# Patient Record
Sex: FEMALE | URBAN - METROPOLITAN AREA
[De-identification: names, ages, dates, MRNs, and addresses within clinical notes are randomized per-mention and may not be internally consistent; named-entity substitution may affect disease eponyms.]

---

## 2022-06-13 ENCOUNTER — OFFICE VISIT (OUTPATIENT)
Dept: URBAN - METROPOLITAN AREA CLINIC 94 | Facility: CLINIC | Age: 82
End: 2022-06-13

## 2025-06-12 ENCOUNTER — OFFICE VISIT (OUTPATIENT)
Dept: URBAN - METROPOLITAN AREA CLINIC 94 | Facility: CLINIC | Age: 85
End: 2025-06-12
Payer: MEDICARE

## 2025-06-12 ENCOUNTER — DASHBOARD ENCOUNTERS (OUTPATIENT)
Age: 85
End: 2025-06-12

## 2025-06-12 ENCOUNTER — LAB OUTSIDE AN ENCOUNTER (OUTPATIENT)
Dept: URBAN - METROPOLITAN AREA CLINIC 94 | Facility: CLINIC | Age: 85
End: 2025-06-12

## 2025-06-12 DIAGNOSIS — K86.2 PANCREATIC CYST: ICD-10-CM

## 2025-06-12 DIAGNOSIS — R19.7 DIARRHEA, UNSPECIFIED TYPE: ICD-10-CM

## 2025-06-12 DIAGNOSIS — R10.30 LOWER ABDOMINAL PAIN: ICD-10-CM

## 2025-06-12 PROCEDURE — 99204 OFFICE O/P NEW MOD 45 MIN: CPT | Performed by: PHYSICIAN ASSISTANT

## 2025-06-12 RX ORDER — COLESTIPOL HYDROCHLORIDE 1 G/1
TABLET, FILM COATED ORAL
Qty: 60 TABLET | Status: ACTIVE | COMMUNITY

## 2025-06-12 RX ORDER — TIMOLOL MALEATE 5 MG/ML
SOLUTION OPHTHALMIC
Qty: 15 MILLILITER | Status: ACTIVE | COMMUNITY

## 2025-06-12 RX ORDER — EMPAGLIFLOZIN 10 MG/1
TABLET, FILM COATED ORAL
Qty: 30 TABLET | Status: ACTIVE | COMMUNITY

## 2025-06-12 RX ORDER — LOSARTAN POTASSIUM 50 MG/1
TABLET, FILM COATED ORAL
Qty: 90 TABLET | Status: ACTIVE | COMMUNITY

## 2025-06-12 NOTE — HPI-TODAY'S VISIT:
83 yo F evaluated today for chronic diarrhea.   Pt reports 3-5 days after she had a colonoscopy, she developed diarrhea. She can have diarreha day and even at night. She is only having watery stools. Pt can have up to 5- 6 BMs daily. Denies bloody stools. She reports taking Colestipol. Also reports periumbical pain. Food does not have any affect on stools. Reports a 10 lb wt loss since diarrhea started.  Denies diarrhea like this before. Denies NSAID use, changes in medication or diet. or travel out of the country  CT - 9/2024- Stable exam.  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 PD is not dilated.  Recommend continued follow-up. MRI ordered by former GI Dr Haney   She had a colonoscopy performed by Dr. Storm on February 5, 2025, that was notable only for sigmoid diverticulosis.

## 2025-06-17 ENCOUNTER — TELEPHONE ENCOUNTER (OUTPATIENT)
Dept: URBAN - METROPOLITAN AREA CLINIC 94 | Facility: CLINIC | Age: 85
End: 2025-06-17

## 2025-06-19 LAB
CRYPTOSPORIDIUM ANTIGEN, EIA: (no result)
GIARDIA AG, EIA, STOOL: (no result)

## 2025-06-23 LAB
CALPROTECTIN, FECAL: 8
CAMPYLOBACTER GROUP: (no result)
CLOSTRIDIUM DIFFICILE TOXINB,QL REAL TIME PCR: NOT DETECTED
OVA AND PARASITES, CONC/PERM SMEAR, 3 SPEC: (no result)
PANCREATIC ELASTASE, FECAL: 476
RESULT:: (no result)

## 2025-07-02 ENCOUNTER — TELEPHONE ENCOUNTER (OUTPATIENT)
Dept: URBAN - METROPOLITAN AREA CLINIC 94 | Facility: CLINIC | Age: 85
End: 2025-07-02

## 2025-07-15 ENCOUNTER — OFFICE VISIT (OUTPATIENT)
Dept: URBAN - METROPOLITAN AREA CLINIC 94 | Facility: CLINIC | Age: 85
End: 2025-07-15
Payer: MEDICARE

## 2025-07-15 ENCOUNTER — LAB OUTSIDE AN ENCOUNTER (OUTPATIENT)
Dept: URBAN - METROPOLITAN AREA CLINIC 94 | Facility: CLINIC | Age: 85
End: 2025-07-15

## 2025-07-15 DIAGNOSIS — K86.2 PANCREATIC CYST: ICD-10-CM

## 2025-07-15 DIAGNOSIS — R19.7 DIARRHEA, UNSPECIFIED TYPE: ICD-10-CM

## 2025-07-15 DIAGNOSIS — R10.30 LOWER ABDOMINAL PAIN: ICD-10-CM

## 2025-07-15 PROCEDURE — 99214 OFFICE O/P EST MOD 30 MIN: CPT | Performed by: PHYSICIAN ASSISTANT

## 2025-07-15 RX ORDER — TIMOLOL MALEATE 5 MG/ML
SOLUTION OPHTHALMIC
Qty: 15 MILLILITER | Status: ACTIVE | COMMUNITY

## 2025-07-15 RX ORDER — EMPAGLIFLOZIN 10 MG/1
TABLET, FILM COATED ORAL
Qty: 30 TABLET | Status: ACTIVE | COMMUNITY

## 2025-07-15 RX ORDER — LOSARTAN POTASSIUM 50 MG/1
TABLET, FILM COATED ORAL
Qty: 90 TABLET | Status: ACTIVE | COMMUNITY

## 2025-07-15 RX ORDER — COLESTIPOL HYDROCHLORIDE 1 G/1
TABLET, FILM COATED ORAL
Qty: 60 TABLET | Status: ACTIVE | COMMUNITY

## 2025-07-15 NOTE — HPI-TODAY'S VISIT:
85 yo F evaluated today for chronic diarrhea.   Since her last visit, patient reports only having diarrhea twice since last visit. She is no longer taking the Colestipol. She reports having solid stools and denies abdominal pain. Denies melena or hematochezia.   Pt was not able to have MRI due to hip replacement.   Previous visit  Pt reports 3-5 days after she had a colonoscopy, she developed diarrhea. She can have diarreha day and even at night. She is only having watery stools. Pt can have up to 5- 6 BMs daily. Denies bloody stools. She reports taking Colestipol. Also reports periumbical pain. Food does not have any affect on stools. Reports a 10 lb wt loss since diarrhea started.  Denies diarrhea like this before. Denies NSAID use, changes in medication or diet. or travel out of the country  CT - 9/2024- Stable exam.  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 PD is not dilated.  Recommend continued follow-up. MRI ordered by former GI Dr Haney   She had a colonoscopy performed by Dr. Storm on February 5, 2025, that was notable only for sigmoid diverticulosis.

## 2025-07-16 LAB
A/G RATIO: 1.5
ABSOLUTE BASOPHILS: 31
ABSOLUTE EOSINOPHILS: 193
ABSOLUTE LYMPHOCYTES: 2703
ABSOLUTE MONOCYTES: 593
ABSOLUTE NEUTROPHILS: 4181
ALBUMIN: 4.4
ALKALINE PHOSPHATASE: 84
ALT (SGPT): 9
AST (SGOT): 16
BASOPHILS: 0.4
BILIRUBIN, TOTAL: 0.7
BUN/CREATININE RATIO: (no result)
BUN: 11
C-REACTIVE PROTEIN, QUANT: <3
CALCIUM: 9.6
CARBON DIOXIDE, TOTAL: 26
CHLORIDE: 104
CREATININE: 0.73
EGFR: 81
EOSINOPHILS: 2.5
GLOBULIN, TOTAL: 3
GLUCOSE: 102
HEMATOCRIT: 43.1
HEMOGLOBIN: 14.1
LIPASE: 13
LYMPHOCYTES: 35.1
MCH: 31.3
MCHC: 32.7
MCV: 95.6
MONOCYTES: 7.7
MPV: 10.5
NEUTROPHILS: 54.3
PLATELET COUNT: 202
POTASSIUM: 4.6
PROTEIN, TOTAL: 7.4
RDW: 13.4
RED BLOOD CELL COUNT: 4.51
SODIUM: 139
WHITE BLOOD CELL COUNT: 7.7

## 2025-07-29 ENCOUNTER — TELEPHONE ENCOUNTER (OUTPATIENT)
Dept: URBAN - METROPOLITAN AREA CLINIC 94 | Facility: CLINIC | Age: 85
End: 2025-07-29

## 2025-08-18 ENCOUNTER — OFFICE VISIT (OUTPATIENT)
Dept: URBAN - METROPOLITAN AREA CLINIC 94 | Facility: CLINIC | Age: 85
End: 2025-08-18
Payer: MEDICARE

## 2025-08-18 DIAGNOSIS — R19.7 DIARRHEA, UNSPECIFIED TYPE: ICD-10-CM

## 2025-08-18 DIAGNOSIS — K86.2 PANCREATIC CYST: ICD-10-CM

## 2025-08-18 PROCEDURE — 99213 OFFICE O/P EST LOW 20 MIN: CPT | Performed by: PHYSICIAN ASSISTANT

## 2025-08-18 RX ORDER — EMPAGLIFLOZIN 10 MG/1
TABLET, FILM COATED ORAL
Qty: 30 TABLET | Status: ACTIVE | COMMUNITY

## 2025-08-18 RX ORDER — TIMOLOL MALEATE 5 MG/ML
SOLUTION OPHTHALMIC
Qty: 15 MILLILITER | Status: ACTIVE | COMMUNITY

## 2025-08-18 RX ORDER — COLESTIPOL HYDROCHLORIDE 1 G/1
TABLET, FILM COATED ORAL
Qty: 60 TABLET | Status: ACTIVE | COMMUNITY

## 2025-08-18 RX ORDER — LOSARTAN POTASSIUM 50 MG/1
TABLET, FILM COATED ORAL
Qty: 90 TABLET | Status: ACTIVE | COMMUNITY